# Patient Record
Sex: FEMALE | Employment: OTHER | URBAN - NONMETROPOLITAN AREA
[De-identification: names, ages, dates, MRNs, and addresses within clinical notes are randomized per-mention and may not be internally consistent; named-entity substitution may affect disease eponyms.]

---

## 2022-07-21 ENCOUNTER — TELEPHONE (OUTPATIENT)
Dept: NEUROLOGY | Age: 87
End: 2022-07-21

## 2022-07-21 RX ORDER — CLOPIDOGREL BISULFATE 75 MG/1
75 TABLET ORAL DAILY
COMMUNITY

## 2022-07-21 RX ORDER — ATORVASTATIN CALCIUM 40 MG/1
40 TABLET, FILM COATED ORAL DAILY
COMMUNITY

## 2022-07-21 RX ORDER — ASCORBIC ACID 500 MG
500 TABLET ORAL DAILY
COMMUNITY

## 2022-07-21 RX ORDER — GABAPENTIN 600 MG/1
600 TABLET ORAL 2 TIMES DAILY
COMMUNITY

## 2022-07-21 RX ORDER — POTASSIUM CHLORIDE 20 MEQ/1
20 TABLET, EXTENDED RELEASE ORAL 2 TIMES DAILY
COMMUNITY

## 2022-07-21 RX ORDER — AMLODIPINE BESYLATE 2.5 MG/1
5 TABLET ORAL DAILY
COMMUNITY

## 2022-07-21 RX ORDER — AMIODARONE HYDROCHLORIDE 200 MG/1
200 TABLET ORAL DAILY
COMMUNITY

## 2022-07-21 RX ORDER — ZOLPIDEM TARTRATE 5 MG/1
5 TABLET ORAL DAILY
COMMUNITY

## 2022-07-21 RX ORDER — TORSEMIDE 20 MG/1
20 TABLET ORAL 2 TIMES DAILY
COMMUNITY

## 2022-07-21 RX ORDER — WARFARIN SODIUM 5 MG/1
5 TABLET ORAL
COMMUNITY

## 2022-07-21 RX ORDER — METOPROLOL SUCCINATE 50 MG/1
50 TABLET, EXTENDED RELEASE ORAL 2 TIMES DAILY
COMMUNITY
End: 2022-08-25

## 2022-07-21 RX ORDER — LOSARTAN POTASSIUM 100 MG/1
100 TABLET ORAL DAILY
COMMUNITY

## 2022-08-02 ENCOUNTER — TELEPHONE (OUTPATIENT)
Dept: NEUROLOGY | Age: 87
End: 2022-08-02

## 2022-08-02 NOTE — TELEPHONE ENCOUNTER
Talked to patient and she was getting ready for her husbands .  She will call back when she is ready for the appointment.

## 2022-08-25 ENCOUNTER — OFFICE VISIT (OUTPATIENT)
Dept: NEUROLOGY | Age: 87
End: 2022-08-25
Payer: MEDICARE

## 2022-08-25 VITALS
RESPIRATION RATE: 16 BRPM | OXYGEN SATURATION: 99 % | HEIGHT: 60 IN | DIASTOLIC BLOOD PRESSURE: 80 MMHG | SYSTOLIC BLOOD PRESSURE: 134 MMHG | HEART RATE: 94 BPM | BODY MASS INDEX: 21.79 KG/M2 | WEIGHT: 111 LBS

## 2022-08-25 DIAGNOSIS — R09.02 HYPOXEMIA: ICD-10-CM

## 2022-08-25 DIAGNOSIS — I38 HEART VALVE DISEASE: ICD-10-CM

## 2022-08-25 DIAGNOSIS — G47.9 SLEEP DIFFICULTIES: ICD-10-CM

## 2022-08-25 DIAGNOSIS — I10 PRIMARY HYPERTENSION: ICD-10-CM

## 2022-08-25 DIAGNOSIS — F06.4 ANXIETY DISORDER DUE TO MEDICAL CONDITION: ICD-10-CM

## 2022-08-25 DIAGNOSIS — B02.29 POST HERPETIC NEURALGIA: Primary | ICD-10-CM

## 2022-08-25 DIAGNOSIS — Z79.01 ANTICOAGULATED ON COUMADIN: ICD-10-CM

## 2022-08-25 DIAGNOSIS — Q24.8 CARDIAC VALVULAR MALFORMATION: ICD-10-CM

## 2022-08-25 PROBLEM — Q75.2 HYPERTELORISM: Status: ACTIVE | Noted: 2022-08-25

## 2022-08-25 PROCEDURE — 99205 OFFICE O/P NEW HI 60 MIN: CPT | Performed by: PSYCHIATRY & NEUROLOGY

## 2022-08-25 PROCEDURE — 99204 OFFICE O/P NEW MOD 45 MIN: CPT

## 2022-08-25 RX ORDER — METOPROLOL SUCCINATE 25 MG/1
TABLET, EXTENDED RELEASE ORAL
COMMUNITY
Start: 2022-08-19

## 2022-08-25 RX ORDER — FUROSEMIDE 40 MG/1
40 TABLET ORAL DAILY
COMMUNITY
Start: 2022-08-18 | End: 2022-11-16

## 2022-08-25 ASSESSMENT — ENCOUNTER SYMPTOMS
NAUSEA: 0
DIARRHEA: 0
VISUAL CHANGE: 0
EYE PAIN: 0
CHEST TIGHTNESS: 0
FACIAL SWELLING: 0
BLOOD IN STOOL: 0
CHOKING: 0
EYE DISCHARGE: 0
VOICE CHANGE: 0
VOMITING: 0
CONSTIPATION: 0
WHEEZING: 0
TROUBLE SWALLOWING: 0
EYE ITCHING: 0
PHOTOPHOBIA: 0
COUGH: 0
COLOR CHANGE: 0
ABDOMINAL PAIN: 0
ABDOMINAL DISTENTION: 0
BACK PAIN: 0
SORE THROAT: 0
APNEA: 0
SINUS PRESSURE: 0
EYE REDNESS: 0
SHORTNESS OF BREATH: 0

## 2022-08-25 ASSESSMENT — PATIENT HEALTH QUESTIONNAIRE - PHQ9
SUM OF ALL RESPONSES TO PHQ QUESTIONS 1-9: 0
1. LITTLE INTEREST OR PLEASURE IN DOING THINGS: 0
SUM OF ALL RESPONSES TO PHQ QUESTIONS 1-9: 0
SUM OF ALL RESPONSES TO PHQ QUESTIONS 1-9: 0
2. FEELING DOWN, DEPRESSED OR HOPELESS: 0
SUM OF ALL RESPONSES TO PHQ9 QUESTIONS 1 & 2: 0
SUM OF ALL RESPONSES TO PHQ QUESTIONS 1-9: 0

## 2022-08-25 NOTE — PROGRESS NOTES
[facility]  Neurology    [address]:[phone]   Fax: [phone]        SUBJECTIVE:       PATIENT ID:  Janell Rock is a  RIGHT     HANDED 80 y.o. female. Neurologic Problem  The patient's primary symptoms include clumsiness. The patient's pertinent negatives include no altered mental status, focal sensory loss, focal weakness, loss of balance, memory loss, near-syncope, slurred speech, syncope, visual change or weakness. Primary symptoms comment: CHRONIC  POST  HERPETIC  NEURALGIA   LEFT  THORACIC   DERMATOMES     WORSE  DURING   EVENING  AND  NIGHT  TIME    AND   WITH   ANXIETY. This is a chronic problem. The neurological problem developed gradually. The problem has been waxing and waning since onset. Pertinent negatives include no abdominal pain, back pain, chest pain, confusion, dizziness, fatigue, fever, headaches, light-headedness, nausea, neck pain, palpitations, shortness of breath or vomiting. Past treatments include medication. The treatment provided mild relief. Her past medical history is significant for dementia and mood changes. There is no history of a bleeding disorder, a clotting disorder, a CVA, head trauma, liver disease or seizures. History obtained from  The   PATIENT    AND   HER  SON       and other  available   medical  records   were  Also  reviewed. The  Duration,  Quality,  Severity,  Location,  Timing,  Context,  Modifying  Factors   Of   The   Chief   Complaint       And  Present  Illness  Was   Reviewed   In   Chronological   Manner.                                             PATIENT'S  MAIN  CONCERNS INCLUDE :                     1)      H/O   CHRONIC   POST   HERPETIC   NEURALGIA                                    IN    THE   LEFT  THORACIC    AREA    SINCE    2020                                 DESCRIBED    AS    \"CONSTANT    BURNING   SHARP    PAIN \"                           2) LEFT    THORACIC     NEURALGIA      WORSENED                                     ANXIETY     AND  MORE   SO  IN   THE  EVENING  AND  NIGHT  TIME                          3)    ON NEURONTIN    300   MG  BID     WITH  MINIMAL    RELIEF                                     AS  PER  PATIENT                          4)      H/O   CARDIAC   VALVULAR  DISEASE       BEING     FOLLOWED                                    BY  CONSULTANTS    IN   [facility]   IN                                 PATIENT      ON    COUMADIN    AND  PLAVIX                          5)    H/O    PLANS   FOR   CARDIAC   VALVE   REPLACEMENT                                 SURGERY    IN  [facility]     IN                                      PATIENT   ALSO     HAS     ANXIETY    RELATED    TO                                 UP   COMING  SURGERY,    MAKING    HER  PAIN  WORSE                         6)     H/O   SLEEP  DIFFICULTIES                                         BETTER   ON     AMBIEN                      7)      H/O   HYPOXEMIA                               -    ON     O 2    SUPPLEMENTATION                      8)      H/O     PAIN  MANAGEMENT     WITH  LOCAL    BLOCKS                                IN  LEFT    LOWER   THORACIC    AREA                                 WITH  MINIMAL  PAIN  IMPROVEMENT   DURING   DAY  ONLY                                            9)         AVAILABLE   PREVIOUS   MEDICAL  RECORDS      AND                               RESULTS /    REPORTS     REVIEWED    IN   DETAIL                             ON    08/25/2022        WITH  PATIENT                   10)      RECOMMENDED :                               A)    FALL  PRECAUTIONS                              B)   LAB   WORK  UP                                 C)    PSYCHOLOGICAL   COUNSELING                                           FOR   HER   ANXIETY    RELATED     TO      UP  COMING                                               SURGERY D)   PRE  SURGICAL   COUNSELING      WITH    HER                                       CARDIOTHORACIC    SURGERY     TEAM                              E)    MAY  INCREASE    NEURONTIN    300   MG  EVENING                                                    600   MG    BED  TIME     AS   TOLERATED                                         SIDE  EFFECTS  OF  NEURONTIN   REVIEWED   WITH  PATIENT                                 F)     FOLLOW  UP    WITH   HER  PCP  AND  PAIN  MANAGEMENT                               G)      ADDITIONAL   MEDICATIONS   COULD  BE  TRIED                                             SYMPTOMATIC    RELIEF  OF   PAIN     IN    FUTURE  VISIT                                          AS  CLINICALLY  INDICATED                            -  ABOVE  REVIEWED   AND  DISCUSSED    WITH  PATIENT  AND  HER  SON                                 AND  QUESTIONS  ANSWERED                        11)       VARIOUS  RISK   FACTORS   WERE  REVIEWED   AND   DISCUSSED. PATIENT   HAS  MULTIPLE   MEDICAL, NEUROLOGICAL                        AND   MENTAL HEALTH   PROBLEMS . PATIENT'S   MANAGEMENT  IS  CHALLENGING.                                         PRECIPITATING  FACTORS: including  fever/infection, exertion/relaxation, position change, stress,                weather change,   medications/alcohol, time of day/darkness/light  Are  present                                                          MODIFYING  FACTORS:  fever/infection, exertion/relaxation, position change, stress, weather change,               medications/alcohol, time of day/darkness/light  Are  present                Patient   Indicates   The  Presence   And  The  Absence  Of  The  Following    Associated  And             Additional  Neurological    Symptoms:                                Balance  And coordination   problems  present           Gait problems     present            Headaches      absent Migraines           absent           Memory problemsabsent              Confusion        absent            Paresthesia   numbness          present           Seizures  And  Starring  Episodes           absent           Syncope,  Near  syncopal episodes         absent           Speech   problems           absent             Swallowing   Problems      absent            Dizziness,  Light headedness           absent              Vertigo        absent             Generalized   Weakness    absent              focal  Weakness     absent             Tremors         absent              Sleep  Problems     present             History  Of   Recent  Head  Injury     absent             History  Of   Recent  TIA     absent             History  Of   Recent    Stroke     absent             Neck  Pain   and   Neck muscle  Spasms  absent               Radiating  down   And   Weakness           absent            Lower back   Pain  And     Spasms  absent              Radiating    Down   And   Weakness          absent                H/OFALLS        absent               History  Of   Visual  Symptoms    absent                  Associated   Diplopia       absent                                               Also   Additional   Symptoms   Present    As  Documented    In   The   detailed                  Review  Of  Systems   And    Please   Refer   To    Them for   Additional    Information. Any components  That are either  Unobtainable  Or  Limited  In   HPI, ROS  And/or PFSH   Are                   Due   ToPatient's  Medical  Problems,  Clinical  Condition   and/or lack of                                 other    Alternate   resources.             RECORDS   REVIEWED:    historical medical records           INFORMATION   REVIEWED:     MEDICAL   HISTORY,SURGICAL   HISTORY,     MEDICATIONS   LIST,   ALLERGIES AND  DRUG  INTOLERANCES,       FAMILY   HISTORY,  SOCIAL  HISTORY,      PROBLEM  LIST   FOR  PATIENT  CARE COORDINATION          History reviewed. No pertinent past medical history. History reviewed. No pertinent surgical history. Current Outpatient Medications   Medication Sig Dispense Refill    furosemide (LASIX) 40 MG tablet Take 40 mg by mouth daily      Calcium Carb-Cholecalciferol (CALCIUM CARBONATE-VITAMIN D3 PO) Take 1 tablet by mouth 2 times daily      OXYGEN Inhale 3 L/min into the lungs continuous      metoprolol succinate (TOPROL XL) 25 MG extended release tablet       amiodarone (CORDARONE) 200 MG tablet Take 200 mg by mouth in the morning. amLODIPine (NORVASC) 2.5 MG tablet Take 5 mg by mouth daily      ascorbic acid (VITAMIN C) 500 MG tablet Take 500 mg by mouth in the morning. atorvastatin (LIPITOR) 40 MG tablet Take 40 mg by mouth in the morning. B COMPLEX-C PO Take by mouth daily      clopidogrel (PLAVIX) 75 MG tablet Take 75 mg by mouth in the morning. losartan (COZAAR) 100 MG tablet Take 100 mg by mouth daily Taking 1/2 tablet daily. potassium chloride (KLOR-CON M) 20 MEQ extended release tablet Take 20 mEq by mouth in the morning and 20 mEq before bedtime. zolpidem (AMBIEN) 5 MG tablet Take 5 mg by mouth in the morning.      gabapentin (NEURONTIN) 600 MG tablet Take 600 mg by mouth 2 times daily. Taking 1/2 to 1 tablet BID.      warfarin (COUMADIN) 5 MG tablet Take 5 mg by mouth Take 1 tablet Fri and 0.5 tablet on all other days OR as directed by [facility]. (      torsemide (DEMADEX) 20 MG tablet Take 20 mg by mouth in the morning and 20 mg in the evening. (Patient not taking: Reported on 8/25/2022)       No current facility-administered medications for this visit. Allergies   Allergen Reactions    Coreg [Carvedilol]      Syncope    Hydrochlorothiazide Cough    Lisinopril Cough         History reviewed. No pertinent family history.       Social History     Socioeconomic History    Marital status: Unknown     Spouse name: Not on file    Number of children: Not on file    Years of education: Not on file    Highest education level: Not on file   Occupational History    Not on file   Tobacco Use    Smoking status: Never    Smokeless tobacco: Never   Substance and Sexual Activity    Alcohol use: Not Currently    Drug use: Never    Sexual activity: Not on file   Other Topics Concern    Not on file   Social History Narrative    Not on file     Social Determinants of Health     Financial Resource Strain: Not on file   Food Insecurity: Not on file   Transportation Needs: Not on file   Physical Activity: Not on file   Stress: Not on file   Social Connections: Not on file   Intimate Partner Violence: Not on file   Housing Stability: Not on file       Vitals:    08/25/22 1017   BP: 134/80   Pulse: 94   Resp: 16   SpO2: 99%         Wt Readings from Last 3 Encounters:   08/25/22 111 lb (50.3 kg)         BP Readings from Last 3 Encounters:   08/25/22 134/80           Review of Systems   Constitutional:  Negative for appetite change, chills, fatigue, fever and unexpected weight change. HENT:  Negative for congestion, dental problem, drooling, ear discharge, ear pain, facial swelling, hearing loss, mouth sores, nosebleeds, postnasal drip, sinus pressure, sore throat, tinnitus, trouble swallowing and voice change. Eyes:  Negative for photophobia, pain, discharge, redness, itching and visual disturbance. Respiratory:  Negative for apnea, cough, choking, chest tightness, shortness of breath and wheezing. Cardiovascular:  Negative for chest pain, palpitations, leg swelling and near-syncope. Gastrointestinal:  Negative for abdominal distention, abdominal pain, blood in stool, constipation, diarrhea, nausea and vomiting. Endocrine: Negative for cold intolerance, heat intolerance, polydipsia, polyphagia and polyuria. Musculoskeletal:  Positive for arthralgias. Negative for back pain, gait problem, joint swelling, myalgias, neck pain and neck stiffness.    Skin: Negative for color change, pallor, rash and wound. Allergic/Immunologic: Negative for environmental allergies, food allergies and immunocompromised state. Neurological:  Negative for dizziness, tremors, focal weakness, seizures, syncope, facial asymmetry, speech difficulty, weakness, light-headedness, numbness, headaches and loss of balance. Hematological:  Negative for adenopathy. Does not bruise/bleed easily. Psychiatric/Behavioral:  Positive for decreased concentration. Negative for agitation, behavioral problems, confusion, dysphoric mood, hallucinations, memory loss, self-injury, sleep disturbance and suicidal ideas. The patient is nervous/anxious. The patient is not hyperactive. OBJECTIVE:      Physical Exam  Constitutional:       Appearance: She is well-developed. HENT:      Head: Normocephalic and atraumatic. No raccoon eyes or Bedolla's sign. Right Ear: External ear normal.      Left Ear: External ear normal.      Nose: Nose normal.   Eyes:      Conjunctiva/sclera: Conjunctivae normal.      Pupils: Pupils are equal, round, and reactive to light. Neck:      Thyroid: No thyroid mass or thyromegaly. Vascular: No carotid bruit. Trachea: No tracheal deviation. Meningeal: Brudzinski's sign and Kernig's sign absent. Cardiovascular:      Rate and Rhythm: Normal rate and regular rhythm. Pulmonary:      Effort: Pulmonary effort is normal.   Musculoskeletal:         General: No tenderness. Cervical back: Normal range of motion and neck supple. No rigidity. No muscular tenderness. Normal range of motion. Skin:     Coloration: Skin is not pale. Findings: No erythema or rash. Nails: There is no clubbing. Psychiatric:         Attention and Perception: She is attentive. Mood and Affect: Mood is anxious. Mood is not depressed. Affect is not labile, blunt or inappropriate. Speech: She is communicative. Speech is delayed.  Speech is not rapid and pressured, slurred or tangential.         Behavior: Behavior is slowed. Behavior is not agitated, aggressive, withdrawn, hyperactive or combative. Behavior is cooperative. Thought Content: Thought content is not paranoid or delusional. Thought content does not include homicidal or suicidal ideation. Thought content does not include homicidal or suicidal plan. Cognition and Memory: Cognition is impaired. Memory is impaired. She does not exhibit impaired recent memory or impaired remote memory. Judgment: Judgment is not impulsive or inappropriate. NEUROLOGICALEXAMINATION :      A) MENTAL STATUS:                   Alert and  oriented  To time, place  And  Person. No Aphasia. No  Dysarthria. Able   To  Follow     SIMPLE    commands   without   Any  Difficulty. No right  To left confusion. Normal  Speech  And language function. Insight and  Judgment ,Fund  Of  Knowledge   within normal limits                Recent  And  Remote memory  DECREASED                 Attention &  Concentration are    DECREASED                                        B) CRANIAL NERVES :        CN : Visual  Acuity  And  Visual fields  within normal limits               Fundi  Could  Not  Be  Could  Not  Be  Evaluated. 3,4,6 CN : Both  Pupils are   Reactive and  Equal.  Movements  Are  Intact. No  Nystagmus. No  FLY. No  Afferent  Pupillary  Defect noted. 5 CN :  Normal  Facial sensations and Corneal  Reflexes           7 CN:  Normal  Facial  Symmetry  And  Strength. No facial  Weakness.            8 CN :  Hearing  Appears    DECREASED           9, 10 CN: Normal   spontaneous, reflex   palate   movements         11 CN:   Normal  Shoulder  shrug and  strength         12 CN :   Normal  Tongue movements and  Tongue  In midline                        No tongue   Fasciculations or atrophy       C) MOTOR  EXAM:                 Strength  In upper  And  Lower   extremities   within   normal limits                        Muscle  Tone  In upper  And  Lower  Extremities  normal                No rigidity. No  Spasticity. Bradykinesia   absent                 No  Asterixis. Sustention  Tremor , Resting   Tremor   absent                    No   other  Abnormal  Movements noted           D) SENSORY :               Light   touch, pinprick,   position  And  Vibration  within normal limits        E) REFLEXES:                   Deep  Tendon  Reflexes   normal                  No  pathological  Reflexes  Bilaterally. F) COORDINATION  AND  GAIT :                                Station and  Gait   SLOW                              Romberg 's test   MILDLY POSITIVE                            Ataxia negative      ASSESSMENT:    Patient Active Problem List   Diagnosis    Post herpetic neuralgia    Hypertelorism    Anticoagulated on Coumadin    Sleep difficulties    Anxiety disorder due to medical condition    Cardiac valvular malformation    Heart valve disease    Hypoxemia           VISITING DIAGNOSIS:      ICD-10-CM    1. Post herpetic neuralgia  B02.29 Sedimentation Rate     CBC     HERPES PROFILE      2. Primary hypertension  I10       3. Anticoagulated on Coumadin  Z79.01       4. Sleep difficulties  G47.9       5. Anxiety disorder due to medical condition  F06.4       6. Cardiac valvular malformation  Q24.8       7. Heart valve disease  I38       8. Hypoxemia  R09.02                          VARIOUS  RISK   FACTORS   WERE  REVIEWED   AND   DISCUSSED. *  PATIENT   HAS  MULTIPLE   MEDICAL, NEUROLOGICAL                        AND   MENTAL HEALTH   PROBLEMS . PATIENT'S   MANAGEMENT  IS  CHALLENGING.             PLAN:                         Dewitte Bolster  Of  The  Diagnoses,  The  Management & Treatment  Options            AND    Care plan  Were          Reviewed and   Discussed   With  patient. * Goals  And  Expectations  Of  The  Therapy  Discussed   And  Reviewed. *   Benefits   And   Side  Effect  Profile  Of  Medication/s   Were   Discussed                * Need   For  Further   Follow up For  The  Various  Problems Were  discussed. * Results  Of  The  Previous  Diagnostic tests were reviewed and  discussed                   Medical  Decision  Making  Was  Made  Based on the   Complexity  Of  Above  Mentioned  Diagnoses,    Data reviewed             And    Risk  Of  Significant   Co morbidities and   complicating   Factors. Medical  Decision  Was    High     Complexity   Due   To  The  Patient's  Multiple  Symptoms  &  Disease,            Complex  Treatment  Regimen,  Multiple medications           and   Risk  Of   Side  Effects,  Difficulty  In  Medication  Management  And  Diagnostic  Challenges           In  View  Of  The  Associated   Co  Morbid  Conditions   And  Problems. * FALL   PRECAUTIONS. THESE  REVIEWED   AND  DISCUSSED      *    SUPERVISED   CARE      *   ABSOLUTELY   NO  DRIVING        *   BE  CAREFUL  WITH  ACTIVITIES  . *   ADEQUATE   FLUID  INTAKE   AND  ELECTROLYTE  BALANCE           * KEEP  DAIRY  OF   THE  NEUROLOGICAL  SYMPTOMS        RECORDING THE    DURATION  AND  FREQUENCY. *  AVOID    CONDITIONS  AND  FACTORS   THAT  MAKE                  NEUROLOGICAL  SYMPTOMS  WORSE.                    *TO  MAINTAIN  REGULAR  SLEEP  WAKE  CYCLES.      *   TO  HAVE  ADEQUATE  REST  AND   SLEEP    HOURS.                *  CONTINUE   MEDICATIONS    PRESCRIBED       AS    RECOMMENDED       *   Compliance   With  Medications   And  Instructions          *    Prophylactic  Use   Of     Vitamin   B   Complex,  Folic  Acid,    Vitamin  B12    Multivitamin,       Calcium  With  magnesium  And  Vit D    Supplementations   Over  The  Counter Discussed             *  PATIENT  IS  ALSO   COUNSELED   TO  KEEP    ACTIVITIES:         A)   SIMPLE      B)  ORGANIZED      C)  WRITEDOWN                     *  EVALUATIONS  AND  FOLLOW UP:                   * PHYSICAL  THERAPY                                  *CARDIOLOGY                        * PAIN  MANAGEMENT                 *     RECOMMENDED :                               A)    FALL  PRECAUTIONS                              B)   LAB   WORK  UP                                 C)    PSYCHOLOGICAL   COUNSELING                                           FOR   HER   ANXIETY    RELATED     TO      UP  COMING                                               SURGERY                                 D)   PRE  SURGICAL   COUNSELING      WITH    HER                                       CARDIOTHORACIC    SURGERY     TEAM                              E)    MAY  INCREASE    NEURONTIN    300   MG  EVENING                                                    600   MG    BED  TIME     AS   TOLERATED                                         SIDE  EFFECTS  OF  NEURONTIN   REVIEWED   WITH  PATIENT                                 F)     FOLLOW  UP    WITH   HER  PCP  AND  PAIN  MANAGEMENT                               G)      ADDITIONAL   MEDICATIONS   COULD  BE  TRIED                                             SYMPTOMATIC    RELIEF  OF   PAIN     IN    FUTURE  VISIT                                          AS  CLINICALLY  INDICATED                            -  ABOVE  REVIEWED   AND  DISCUSSED    WITH  PATIENT  AND  HER  SON                                 AND  QUESTIONS  ANSWERED                                  Orders Placed This Encounter   Procedures    Sedimentation Rate    CBC    HERPES PROFILE                               *  PATIENT  TO  RETURN  THE  CLINIC  AFTER   ABOVE,                       FOR   FURTHER    RE EVALUATIONS                               AND  ADDITIONAL  RECOMMENDATIONS             *PATIENT   TO  FOLLOW  UP WITH   PRIMARY  CARE         OTHER  CONSULTANTS  AS  BEFORE.               *TO  FOLLOW  WITH   MENTAL  HEALTH  PROFESSIONALS ,  INCLUDING            PSYCHOLOGICAL  COUNSELING   AND  PSYCHIATRIC  EVALUTIONS,                     *  Maintain   Healthy  Life Style    With   Periodic  Monitoring  Of          Any  Medical  Conditions  Including   Elevated  Blood  Pressure,  Lipid  Profile,        Blood  Sugar levels  AndHeart  Disease. *   Period   Screening  For  Cancers  Involving  Breast,  Colon,         Lungs  And  Other  Organs  As  Applicable,  In consultation   With  Your  Primary Care Providers. *Second  Neurological  Opinion  And  Evaluations  In  [facility] AND [facility]  Setting  If  Patient  Is  Interested. * Please   Contact   Neurology  Clinic   Early   If   Are  Any  New  Neurological   And  Any neurological  Concerns. *  If  The  Patient remains  Neurologically  Stable   Return   To  [facility] Neurology Department   IN      1-2        MONTHS  TIME   FOR  FURTHER              FOLLOW UP.                       *   The  Neurological   Findings,  Possible  Diagnosis,  Differential diagnoses                    And  Options  For    Further   Investigations                   And  management   Are  Discussed  Comprehensively. Medications   And  Prescription   Risks  And  Side effects  Are   Also  Discussed. *  If   There is  Any  Significant  Worsening   Of  Current  Symptoms  And  Or                  If patient  Develops   Any additional  New  NeurologicalSymptoms                  Or  Significant  Concerns   Should  Call  911 or  Go  To  Emergency  Department                  For  Further  Immediate  Evaluation and  management . The   Above  Were  Reviewed  With  PATIENT   and   HER  SON                        questions  Answered  In  Detail. TOTAL   TIME     SPENT :               On this date 8/25/2022 I have spent  65  minutes     reviewing previous notes, test results               and face to face time with the patient including     discussing the diagnosis and importance of compliance               with the treatment plan  as well as documenting on the day of the visit. Electronically signed by   Neri Duenas M.D., [facility]. Board Certified in  Neurology &  In  [facility] ([facility])      DISCLAIMER:   Although every effort was made to ensure the accuracy of this  electronictranscription, some errors in transcription may have occurred. GENERAL PATIENT INSTRUCTIONS:     A Healthy Lifestyle: Care Instructions  Your Care Instructions  A healthy lifestyle can help you feel good, stay at ahealthy weight, and have plenty of energy for both work and play. A healthy lifestyle is something you can share with your whole family. A healthy lifestyle also can lower your risk for serious health problems, such ashigh blood pressure, heart disease, and diabetes. You can follow a few steps listed below to improve your health and the health of your family. Follow-up careis a key part of your treatment and safety. Be sure to make and go to all appointments, and call your doctor if you are having problems. Its also a good idea to know your test results and keep a list of the medicines you take. How can you care for yourself at home? Do not eat too much sugar, fat, or fast foods. You can still have dessert and treats nowand then. The goal is moderation. Start small to improve your eating habits. Pay attention to portion sizes, drink less juice and soda pop, and eat more fruits and vegetables. Eat a healthy amount of food. A 3-ounce serving of meat, for example, is about the size of a deck of cards.  Fill the rest of your plate with vegetables and whole grains. Limit theamount of soda and sports drinks you have every day. Drink more water when you are thirsty. Eat at least 5 servings of fruits and vegetables every day. It may seem like a lot, but it is not hard to reach this goal. Aserving or helping is 1 piece of fruit, 1 cup of vegetables, or 2 cups of leafy, raw vegetables. Have an apple or some carrot sticks as an afternoon snack instead of a candy bar. Try to have fruits and/or vegetables at everymeal.  Make exercise part of your daily routine. You may want to start with simple activities, such as walking, bicycling, or slow swimming. Try katherine active 30 to 60 minutes every day. You do not need to do all 30 to 60 minutes all at once. For example, you can exercise 3 times a day for 10 or 20 minutes. Moderate exercise is safe for most people, but it is always agood idea to talk to your doctor before starting an exercise program.  Keep moving. Meggan Cabezas the lawn, work in the garden, or [employer]. Take the stairs instead of the elevator at work. If you smoke, quit. Peoplewho smoke have an increased risk for heart attack, stroke, cancer, and other lung illnesses. Quitting is hard, but there are ways to boost your chance of quitting tobacco for good. Use nicotine gum, patches, or lozenges. Ask your doctor about stop-smoking programs and medicines. Keep trying. In addition to reducing your risk of diseases in the future, you will notice some benefits soon after you stop using tobacco. If you have shortness of breath or asthma symptoms, they will likely getbetter within a few weeks after you quit. Limit how much alcohol you drink. Moderate amounts of alcohol (up to 2 drinks a day for men, 1drink a day for women) are okay. But drinking too much can lead to liver problems, high blood pressure, and other health problems. health  If you have a family, there are many things you can do together to improve your health.   Eat meals together as a family as often as possible. Eat healthy foods. This includes fruits, vegetables, lean meats and dairy, and whole grains. Include your family in your fitness plan. Most peoplethink of activities such as jogging or tennis as the way to fitness, but there are many ways you and your family can be more active. Anything that makes you breathe hard and gets your heart pumping is exercise. Here are sometips:  Walk to do errands or to take your child to school or the bus. Go for a family bike ride after dinner instead of watching TV. Where can you learn more? Go toThermedicaltps://[employer]Appatureeb.[employer]. org and sign in to your [employer] account. Enter W310 in the Search HealthInformation box to learn more about \"A Healthy Lifestyle: Care Instructions. \"     If you do not have anaccount, please click on the \"Sign Up Now\" link. Current as of: July 26, 2016  Content Version: 11.2  © 0228-9847 [employer]. Care instructions adapted under license by [facility] ([facility]). If you have questions about a medical condition or this instruction, always ask your healthcare professional. [employer] disclaims any warranty or liability for your use of this information.